# Patient Record
Sex: FEMALE | Race: WHITE | URBAN - METROPOLITAN AREA
[De-identification: names, ages, dates, MRNs, and addresses within clinical notes are randomized per-mention and may not be internally consistent; named-entity substitution may affect disease eponyms.]

---

## 2021-09-10 ENCOUNTER — OFFICE VISIT (OUTPATIENT)
Dept: FAMILY MEDICINE CLINIC | Facility: CLINIC | Age: 71
End: 2021-09-10
Payer: MEDICARE

## 2021-09-10 VITALS
BODY MASS INDEX: 23.82 KG/M2 | DIASTOLIC BLOOD PRESSURE: 84 MMHG | WEIGHT: 148.19 LBS | OXYGEN SATURATION: 98 % | SYSTOLIC BLOOD PRESSURE: 130 MMHG | RESPIRATION RATE: 16 BRPM | TEMPERATURE: 98 F | HEIGHT: 66 IN | HEART RATE: 105 BPM

## 2021-09-10 DIAGNOSIS — Z00.00 ENCOUNTER FOR ANNUAL HEALTH EXAMINATION: ICD-10-CM

## 2021-09-10 DIAGNOSIS — E78.49 OTHER HYPERLIPIDEMIA: ICD-10-CM

## 2021-09-10 DIAGNOSIS — E11.9 CONTROLLED TYPE 2 DIABETES MELLITUS WITHOUT COMPLICATION, WITHOUT LONG-TERM CURRENT USE OF INSULIN (HCC): ICD-10-CM

## 2021-09-10 DIAGNOSIS — Z00.00 ENCOUNTER FOR MEDICARE ANNUAL WELLNESS EXAM: Primary | ICD-10-CM

## 2021-09-10 DIAGNOSIS — I10 BENIGN ESSENTIAL HYPERTENSION: ICD-10-CM

## 2021-09-10 PROBLEM — N20.0 CALCULUS OF KIDNEY: Status: RESOLVED | Noted: 2018-07-05 | Resolved: 2021-09-10

## 2021-09-10 PROBLEM — E21.0 PRIMARY HYPERPARATHYROIDISM (HCC): Status: ACTIVE | Noted: 2017-12-13

## 2021-09-10 PROBLEM — N20.0 CALCULUS OF KIDNEY: Status: ACTIVE | Noted: 2018-07-05

## 2021-09-10 PROBLEM — E78.5 HYPERLIPIDEMIA: Status: ACTIVE | Noted: 2017-09-08

## 2021-09-10 PROCEDURE — G0439 PPPS, SUBSEQ VISIT: HCPCS | Performed by: FAMILY MEDICINE

## 2021-09-10 RX ORDER — METOPROLOL SUCCINATE 100 MG/1
100 TABLET, EXTENDED RELEASE ORAL DAILY
COMMUNITY
Start: 2021-07-14 | End: 2022-01-26

## 2021-09-10 RX ORDER — FENOFIBRATE 145 MG/1
145 TABLET, COATED ORAL DAILY
COMMUNITY
Start: 2021-07-14 | End: 2022-01-26

## 2021-09-10 RX ORDER — AMLODIPINE BESYLATE 5 MG/1
5 TABLET ORAL DAILY
COMMUNITY
Start: 2021-07-14 | End: 2022-01-26

## 2021-09-10 RX ORDER — METFORMIN HYDROCHLORIDE 500 MG/1
500 TABLET, EXTENDED RELEASE ORAL 2 TIMES DAILY
COMMUNITY
Start: 2021-07-14 | End: 2022-01-26

## 2021-09-10 RX ORDER — GLIPIZIDE 5 MG/1
5 TABLET, FILM COATED, EXTENDED RELEASE ORAL DAILY
COMMUNITY
Start: 2021-07-14 | End: 2022-01-26

## 2021-09-10 RX ORDER — EZETIMIBE 10 MG/1
10 TABLET ORAL DAILY
COMMUNITY
Start: 2021-07-14 | End: 2022-01-26

## 2021-09-10 RX ORDER — ENALAPRIL MALEATE 20 MG/1
20 TABLET ORAL 2 TIMES DAILY
COMMUNITY
Start: 2021-07-14 | End: 2022-01-26

## 2021-09-10 NOTE — PATIENT INSTRUCTIONS
Continue current medications  Labs reviewed from [facility]. Mammogram uptodate. Declined pneumonia vaccine. Blood pressure stable today. Return to clinic if any concern.      Advice low carb in diet, AVOID FOOD WITH HIGH GLYCEMIC INDEX, have smalle years    Covered every 2 years if patient is at high risk or previous colonoscopy was abnormal -    Colonoscopy Never done    Flexible Sigmoidoscopy   Covered every 4 years -    Fecal Occult Blood Test Covered annually -   Bone Density Screening    Bone de results found for: BUN    Drug Serum Conc Annually No results found for: DIGOXIN, DIG, VALP              Recommended Websites for Advanced Directives    [email or web address]  An information packet, including necessary

## 2021-09-10 NOTE — PROGRESS NOTES
HPI:   Tori Palmer is a 66year old female who presents for a Medicare Subsequent Annual Wellness visit (Pt already had Initial Annual Wellness). Patient presents to establish care, she is transferring care.   Has history of diabetes type 2, hyperten (Family Medicine)    Patient Active Problem List:     Benign essential hypertension     Controlled type 2 diabetes mellitus without complication, without long-term current use of insulin (HCC)     Disorder of bone and cartilage     Hyperlipidemia     Cale Bowser itching, no complaint of urinary incontinence   MUSCULOSKELETAL: denies back pain  NEURO: denies headaches  PSYCHE: denies depression or anxiety  HEMATOLOGIC: denies hx of anemia  ENDOCRINE: denies thyroid history  ALL/ASTHMA: denies hx of allergy or asthm Normal       Vaccination History     There is no immunization history on file for this patient. ASSESSMENT AND OTHER RELEVANT CHRONIC CONDITIONS:   Candy Carney is a 66year old female who presents for a Medicare Assessment.      PLAN SUMMARY:   Willian Conteh physician but may not be covered, or covered at this frequency, by your insurer. Please check with your insurance carrier before scheduling to verify coverage.    PREVENTATIVE SERVICES FREQUENCY &  COVERAGE DETAILS LAST COMPLETION DATE   Diabetes Screenin this time   Screening Mammogram    Mammogram     Recommend annually for all female patients aged 36 and older    One baseline mammogram covered for patients aged 32-38 -    Mammogram due on 08/02/2022    Immunizations    Influenza Covered once per flu seas

## 2022-01-26 ENCOUNTER — LAB ENCOUNTER (OUTPATIENT)
Dept: LAB | Age: 72
End: 2022-01-26
Attending: FAMILY MEDICINE
Payer: MEDICARE

## 2022-01-26 ENCOUNTER — OFFICE VISIT (OUTPATIENT)
Dept: FAMILY MEDICINE CLINIC | Facility: CLINIC | Age: 72
End: 2022-01-26
Payer: MEDICARE

## 2022-01-26 VITALS
DIASTOLIC BLOOD PRESSURE: 80 MMHG | TEMPERATURE: 99 F | WEIGHT: 149.19 LBS | BODY MASS INDEX: 23.98 KG/M2 | RESPIRATION RATE: 18 BRPM | HEART RATE: 97 BPM | SYSTOLIC BLOOD PRESSURE: 132 MMHG | OXYGEN SATURATION: 98 % | HEIGHT: 66 IN

## 2022-01-26 DIAGNOSIS — E11.9 CONTROLLED TYPE 2 DIABETES MELLITUS WITHOUT COMPLICATION, WITHOUT LONG-TERM CURRENT USE OF INSULIN (HCC): Primary | ICD-10-CM

## 2022-01-26 DIAGNOSIS — E78.49 OTHER HYPERLIPIDEMIA: ICD-10-CM

## 2022-01-26 DIAGNOSIS — Z12.11 COLON CANCER SCREENING: ICD-10-CM

## 2022-01-26 DIAGNOSIS — I10 BENIGN ESSENTIAL HYPERTENSION: ICD-10-CM

## 2022-01-26 DIAGNOSIS — E11.9 CONTROLLED TYPE 2 DIABETES MELLITUS WITHOUT COMPLICATION, WITHOUT LONG-TERM CURRENT USE OF INSULIN (HCC): ICD-10-CM

## 2022-01-26 LAB
ALBUMIN SERPL-MCNC: 3.8 G/DL (ref 3.4–5)
ALBUMIN/GLOB SERPL: 1.3 {RATIO} (ref 1–2)
ALP LIVER SERPL-CCNC: 95 U/L
ALT SERPL-CCNC: 32 U/L
ANION GAP SERPL CALC-SCNC: 7 MMOL/L (ref 0–18)
AST SERPL-CCNC: 18 U/L (ref 15–37)
BILIRUB SERPL-MCNC: 0.4 MG/DL (ref 0.1–2)
BUN BLD-MCNC: 23 MG/DL (ref 7–18)
CALCIUM BLD-MCNC: 10.1 MG/DL (ref 8.5–10.1)
CHLORIDE SERPL-SCNC: 107 MMOL/L (ref 98–112)
CO2 SERPL-SCNC: 28 MMOL/L (ref 21–32)
CREAT BLD-MCNC: 0.96 MG/DL
EST. AVERAGE GLUCOSE BLD GHB EST-MCNC: 126 MG/DL (ref 68–126)
FASTING STATUS PATIENT QL REPORTED: NO
GLOBULIN PLAS-MCNC: 3 G/DL (ref 2.8–4.4)
GLUCOSE BLD-MCNC: 179 MG/DL (ref 70–99)
HBA1C MFR BLD: 6 % (ref ?–5.7)
OSMOLALITY SERPL CALC.SUM OF ELEC: 302 MOSM/KG (ref 275–295)
POTASSIUM SERPL-SCNC: 4.2 MMOL/L (ref 3.5–5.1)
PROT SERPL-MCNC: 6.8 G/DL (ref 6.4–8.2)
SODIUM SERPL-SCNC: 142 MMOL/L (ref 136–145)

## 2022-01-26 PROCEDURE — 36415 COLL VENOUS BLD VENIPUNCTURE: CPT

## 2022-01-26 PROCEDURE — 80053 COMPREHEN METABOLIC PANEL: CPT

## 2022-01-26 PROCEDURE — 83036 HEMOGLOBIN GLYCOSYLATED A1C: CPT

## 2022-01-26 PROCEDURE — 99214 OFFICE O/P EST MOD 30 MIN: CPT | Performed by: FAMILY MEDICINE

## 2022-01-26 RX ORDER — METFORMIN HYDROCHLORIDE 500 MG/1
500 TABLET, EXTENDED RELEASE ORAL 2 TIMES DAILY
Qty: 90 TABLET | Refills: 1 | Status: SHIPPED | OUTPATIENT
Start: 2022-01-26

## 2022-01-26 RX ORDER — METOPROLOL SUCCINATE 100 MG/1
100 TABLET, EXTENDED RELEASE ORAL DAILY
Qty: 90 TABLET | Refills: 1 | Status: SHIPPED | OUTPATIENT
Start: 2022-01-26

## 2022-01-26 RX ORDER — PRAVASTATIN SODIUM 20 MG
1 TABLET ORAL DAILY
COMMUNITY
Start: 2021-11-18 | End: 2022-01-26

## 2022-01-26 RX ORDER — FENOFIBRATE 145 MG/1
145 TABLET, COATED ORAL DAILY
Qty: 90 TABLET | Refills: 1 | Status: SHIPPED | OUTPATIENT
Start: 2022-01-26

## 2022-01-26 RX ORDER — GLIPIZIDE 5 MG/1
5 TABLET, FILM COATED, EXTENDED RELEASE ORAL DAILY
Qty: 90 TABLET | Refills: 1 | Status: SHIPPED | OUTPATIENT
Start: 2022-01-26

## 2022-01-26 RX ORDER — ENALAPRIL MALEATE 20 MG/1
20 TABLET ORAL 2 TIMES DAILY
Qty: 90 TABLET | Refills: 1 | Status: SHIPPED | OUTPATIENT
Start: 2022-01-26

## 2022-01-26 RX ORDER — PRAVASTATIN SODIUM 20 MG
20 TABLET ORAL DAILY
Qty: 90 TABLET | Refills: 1 | Status: SHIPPED | OUTPATIENT
Start: 2022-01-26

## 2022-01-26 RX ORDER — EZETIMIBE 10 MG/1
10 TABLET ORAL DAILY
Qty: 90 TABLET | Refills: 1 | Status: SHIPPED | OUTPATIENT
Start: 2022-01-26

## 2022-01-26 RX ORDER — AMLODIPINE BESYLATE 5 MG/1
5 TABLET ORAL DAILY
Qty: 90 TABLET | Refills: 1 | Status: SHIPPED | OUTPATIENT
Start: 2022-01-26

## 2022-01-26 NOTE — PROGRESS NOTES
[address]  PROGRESS NOTE  Chief Complaint:   Patient presents with: Follow - Up      HPI:   This is a 66year old female with diabetes type 2, hypertension, hyperlipidemia presents for follow-up and medication checkup.     Has  been com mg total) by mouth daily. 90 tablet 1   • fenofibrate 145 MG Oral Tab Take 1 tablet (145 mg total) by mouth daily. 90 tablet 1   • glipiZIDE ER 5 MG Oral Tablet 24 Hr Take 1 tablet (5 mg total) by mouth daily.  90 tablet 1   • metFORMIN HCl  MG Oral T Exam:  GEN:  Patient is alert, awake and oriented, well developed, well nourished, no acute distress. EYES:  Sclera anicteric, conjunctiva normal, PERRLA, EOMI. NECK: Supple, no carotid bruit, no JVD, no thyromegaly.   LUNGS: Clear to auscultation bilter (145 mg total) by mouth daily.  -     glipiZIDE ER 5 MG Oral Tablet 24 Hr; Take 1 tablet (5 mg total) by mouth daily. -     metFORMIN HCl  MG Oral Tablet 24 Hr; Take 1 tablet (500 mg total) by mouth 2 (two) times daily.   -     metoprolol succinate 1 [facility])     Disorder of bone and cartilage     Hyperlipidemia     Primary hyperparathyroidism ([facility] [address]      Marina Lefort, MD    This note was created utilizing Dragon speech recognition software.  Please excuse any grammatical errors.  Call my office if you have

## 2022-01-26 NOTE — PATIENT INSTRUCTIONS
Continue current medications  Blood pressure stable today. Advice low salt diet and exercise. Monitor your blood pressure. Return to clinic if systolic blood pressure more than 709 or diastolic more than 211.   Advice low carb in diet, AVOID FOOD WITH HIG

## 2022-01-27 ENCOUNTER — TELEPHONE (OUTPATIENT)
Dept: FAMILY MEDICINE CLINIC | Facility: CLINIC | Age: 72
End: 2022-01-27

## 2022-01-27 NOTE — TELEPHONE ENCOUNTER
----- Message from Josseline James MD sent at 1/27/2022 10:03 AM CST -----  Please inform patient that her hemoglobin A1c is stable at 6. 0. Her glucose level is 179. Rest of CMP is okay. Continue with low-carb diet, exercise and current medications.

## 2022-01-31 ENCOUNTER — LAB ENCOUNTER (OUTPATIENT)
Dept: LAB | Facility: HOSPITAL | Age: 72
End: 2022-01-31
Attending: FAMILY MEDICINE
Payer: MEDICARE

## 2022-01-31 DIAGNOSIS — Z12.11 COLON CANCER SCREENING: ICD-10-CM

## 2022-02-01 ENCOUNTER — TELEPHONE (OUTPATIENT)
Dept: FAMILY MEDICINE CLINIC | Facility: CLINIC | Age: 72
End: 2022-02-01

## 2022-02-01 LAB — HEMOCCULT STL QL: NEGATIVE

## 2022-02-01 PROCEDURE — 82274 ASSAY TEST FOR BLOOD FECAL: CPT

## 2022-02-01 NOTE — TELEPHONE ENCOUNTER
----- Message from Manuel Patel MD sent at 2/1/2022 10:34 AM CST -----  Please inform patient that her fit test is negative.

## 2022-08-10 ENCOUNTER — TELEPHONE (OUTPATIENT)
Dept: FAMILY MEDICINE CLINIC | Facility: CLINIC | Age: 72
End: 2022-08-10

## 2022-08-10 ENCOUNTER — LABORATORY ENCOUNTER (OUTPATIENT)
Dept: LAB | Age: 72
End: 2022-08-10
Attending: FAMILY MEDICINE
Payer: MEDICARE

## 2022-08-10 ENCOUNTER — OFFICE VISIT (OUTPATIENT)
Dept: FAMILY MEDICINE CLINIC | Facility: CLINIC | Age: 72
End: 2022-08-10
Payer: MEDICARE

## 2022-08-10 VITALS
HEART RATE: 84 BPM | SYSTOLIC BLOOD PRESSURE: 122 MMHG | TEMPERATURE: 98 F | RESPIRATION RATE: 18 BRPM | OXYGEN SATURATION: 98 % | HEIGHT: 66 IN | BODY MASS INDEX: 21.73 KG/M2 | WEIGHT: 135.19 LBS | DIASTOLIC BLOOD PRESSURE: 78 MMHG

## 2022-08-10 DIAGNOSIS — Z12.31 BREAST CANCER SCREENING BY MAMMOGRAM: ICD-10-CM

## 2022-08-10 DIAGNOSIS — E78.49 OTHER HYPERLIPIDEMIA: ICD-10-CM

## 2022-08-10 DIAGNOSIS — E11.9 CONTROLLED TYPE 2 DIABETES MELLITUS WITHOUT COMPLICATION, WITHOUT LONG-TERM CURRENT USE OF INSULIN (HCC): ICD-10-CM

## 2022-08-10 DIAGNOSIS — E11.9 CONTROLLED TYPE 2 DIABETES MELLITUS WITHOUT COMPLICATION, WITHOUT LONG-TERM CURRENT USE OF INSULIN (HCC): Primary | ICD-10-CM

## 2022-08-10 DIAGNOSIS — I10 BENIGN ESSENTIAL HYPERTENSION: ICD-10-CM

## 2022-08-10 LAB
ALBUMIN SERPL-MCNC: 3.9 G/DL (ref 3.4–5)
ALBUMIN/GLOB SERPL: 1.1 {RATIO} (ref 1–2)
ALP LIVER SERPL-CCNC: 82 U/L
ALT SERPL-CCNC: 29 U/L
ANION GAP SERPL CALC-SCNC: 7 MMOL/L (ref 0–18)
AST SERPL-CCNC: 19 U/L (ref 15–37)
BILIRUB SERPL-MCNC: 0.4 MG/DL (ref 0.1–2)
BUN BLD-MCNC: 21 MG/DL (ref 7–18)
CALCIUM BLD-MCNC: 10.2 MG/DL (ref 8.5–10.1)
CHLORIDE SERPL-SCNC: 104 MMOL/L (ref 98–112)
CHOLEST SERPL-MCNC: 298 MG/DL (ref ?–200)
CO2 SERPL-SCNC: 25 MMOL/L (ref 21–32)
CREAT BLD-MCNC: 1 MG/DL
EST. AVERAGE GLUCOSE BLD GHB EST-MCNC: 120 MG/DL (ref 68–126)
FASTING PATIENT LIPID ANSWER: YES
FASTING STATUS PATIENT QL REPORTED: YES
GFR SERPLBLD BASED ON 1.73 SQ M-ARVRAT: 59 ML/MIN/1.73M2 (ref 60–?)
GLOBULIN PLAS-MCNC: 3.4 G/DL (ref 2.8–4.4)
GLUCOSE BLD-MCNC: 71 MG/DL (ref 70–99)
HBA1C MFR BLD: 5.8 % (ref ?–5.7)
HDLC SERPL-MCNC: 52 MG/DL (ref 40–59)
LDLC SERPL CALC-MCNC: 186 MG/DL (ref ?–100)
NONHDLC SERPL-MCNC: 246 MG/DL (ref ?–130)
OSMOLALITY SERPL CALC.SUM OF ELEC: 283 MOSM/KG (ref 275–295)
POTASSIUM SERPL-SCNC: 3.9 MMOL/L (ref 3.5–5.1)
PROT SERPL-MCNC: 7.3 G/DL (ref 6.4–8.2)
SODIUM SERPL-SCNC: 136 MMOL/L (ref 136–145)
TRIGL SERPL-MCNC: 309 MG/DL (ref 30–149)
VLDLC SERPL CALC-MCNC: 66 MG/DL (ref 0–30)

## 2022-08-10 PROCEDURE — 80053 COMPREHEN METABOLIC PANEL: CPT

## 2022-08-10 PROCEDURE — 99214 OFFICE O/P EST MOD 30 MIN: CPT | Performed by: FAMILY MEDICINE

## 2022-08-10 PROCEDURE — 80061 LIPID PANEL: CPT

## 2022-08-10 PROCEDURE — 36415 COLL VENOUS BLD VENIPUNCTURE: CPT

## 2022-08-10 PROCEDURE — 83036 HEMOGLOBIN GLYCOSYLATED A1C: CPT

## 2022-08-10 NOTE — TELEPHONE ENCOUNTER
Spoke to pt inquired about where medication was sent and where to call to get mammo scheduled. No further question.

## 2022-08-10 NOTE — PATIENT INSTRUCTIONS
Continue current medications  Blood pressure slightly elevated. Advice low salt diet and exercise. Monitor your blood pressure. Return to clinic if systolic blood pressure more than 140 or diastolic more than 988. Advice low carb in diet, AVOID FOOD WITH HIGH GLYCEMIC INDEX, have smaller portion meal, avoid bread, pasta and potatoes, no juice or soda, don't eat late at night, exercise,  and weight loss. Continue to monitor glucose level, call if glucose level less than 70 or more than 300. Advice low cholesterol diet and exercise. May use fish oil supplement. Recommend to get glucose monitor. Recommend to schedule eye exam as soon as possible. Have them fax result to us at [phone]. Schedule fasting labs. Recommend mammogram.  Recommend shingles vaccines.

## 2022-08-11 RX ORDER — GLIPIZIDE 2.5 MG/1
2.5 TABLET, EXTENDED RELEASE ORAL DAILY
Qty: 90 TABLET | Refills: 1 | Status: SHIPPED | OUTPATIENT
Start: 2022-08-11

## 2022-08-11 NOTE — TELEPHONE ENCOUNTER
Please inform patient that her total cholesterol remains elevated at 298, triglycerides are elevated at 309, LDL cholesterol is elevated at 186. Hemoglobin A1c has gone down to 5.8. Calcium is very slightly elevated at 10.2, recommend to cut back on any calcium supplement. Also recommend to continue with current cholesterol medication, low-cholesterol diet, exercise and fish oil supplement twice a day. Due to low hemoglobin A1c I recommend to cut down on glipizide to 2.5 mg daily. Continue to monitor for low glucose level. Call back his glucose level is less than 70. I have pended medication, okay to send after discussing results with patient.

## 2022-10-11 DIAGNOSIS — E11.9 CONTROLLED TYPE 2 DIABETES MELLITUS WITHOUT COMPLICATION, WITHOUT LONG-TERM CURRENT USE OF INSULIN (HCC): Primary | ICD-10-CM

## 2022-10-11 RX ORDER — METFORMIN HYDROCHLORIDE 500 MG/1
500 TABLET, EXTENDED RELEASE ORAL 2 TIMES DAILY
Qty: 90 TABLET | Refills: 1 | Status: SHIPPED | OUTPATIENT
Start: 2022-10-11

## 2022-10-11 NOTE — TELEPHONE ENCOUNTER
Last refill - 1/26/22 #90 w/1refill    Future appt: Your appointments     Date & Time Appointment Department [facility])    Feb 01, 2023  8:00 AM CST Medicare Annual Well Visit with Shea Talbert MD 25 [facility] ([facility])            [facility]  [facility] [address] [id number]  [phone]          Last Appointment with provider: 8/10/2022 - dm  Last appointment at [facility]:  8/10/2022  Cholesterol, Total (mg/dL)   Date Value   08/10/2022 298 (H)     HDL Cholesterol (mg/dL)   Date Value   08/10/2022 52     LDL Cholesterol (mg/dL)   Date Value   08/10/2022 186 (H)     Triglycerides (mg/dL)   Date Value   08/10/2022 309 (H)     Lab Results   Component Value Date     08/10/2022    A1C 5.8 (H) 08/10/2022     No results found for: T4F, TSH, TSHT4    No follow-ups on file.

## 2022-11-29 ENCOUNTER — TELEPHONE (OUTPATIENT)
Dept: FAMILY MEDICINE CLINIC | Facility: CLINIC | Age: 72
End: 2022-11-29

## 2022-11-29 NOTE — TELEPHONE ENCOUNTER
Spoke with pt and informed her of Dr. Omar Councilman recommendations. Pt verbalizes agreement and will notify us when she completes booster vaccination.

## 2022-11-29 NOTE — TELEPHONE ENCOUNTER
Pt states that she got her covid shot but wants to know if she should get the booster due to her age. Would like a call back.

## 2022-12-21 ENCOUNTER — TELEPHONE (OUTPATIENT)
Dept: FAMILY MEDICINE CLINIC | Facility: CLINIC | Age: 72
End: 2022-12-21

## 2022-12-21 DIAGNOSIS — E11.9 CONTROLLED TYPE 2 DIABETES MELLITUS WITHOUT COMPLICATION, WITHOUT LONG-TERM CURRENT USE OF INSULIN (HCC): ICD-10-CM

## 2022-12-21 RX ORDER — METFORMIN HYDROCHLORIDE 500 MG/1
500 TABLET, EXTENDED RELEASE ORAL 2 TIMES DAILY
Qty: 90 TABLET | Refills: 0 | Status: SHIPPED | OUTPATIENT
Start: 2022-12-21

## 2022-12-21 RX ORDER — PRAVASTATIN SODIUM 20 MG
20 TABLET ORAL DAILY
Qty: 90 TABLET | Refills: 0 | Status: SHIPPED | OUTPATIENT
Start: 2022-12-21

## 2022-12-21 RX ORDER — ENALAPRIL MALEATE 20 MG/1
20 TABLET ORAL 2 TIMES DAILY
Qty: 90 TABLET | Refills: 1 | Status: SHIPPED | OUTPATIENT
Start: 2022-12-21

## 2022-12-21 RX ORDER — AMLODIPINE BESYLATE 5 MG/1
5 TABLET ORAL DAILY
Qty: 90 TABLET | Refills: 0 | Status: SHIPPED | OUTPATIENT
Start: 2022-12-21

## 2022-12-21 RX ORDER — EZETIMIBE 10 MG/1
10 TABLET ORAL DAILY
Qty: 90 TABLET | Refills: 0 | Status: SHIPPED | OUTPATIENT
Start: 2022-12-21

## 2022-12-21 RX ORDER — GLIPIZIDE 2.5 MG/1
2.5 TABLET, EXTENDED RELEASE ORAL DAILY
Qty: 90 TABLET | Refills: 0 | Status: SHIPPED | OUTPATIENT
Start: 2022-12-21

## 2022-12-21 NOTE — TELEPHONE ENCOUNTER
Needs all rxs filled before Feb appt.  Wants 1 month sent to [facility] on all 7 of them

## 2022-12-21 NOTE — TELEPHONE ENCOUNTER
Last appt 8/10/22 advised Return in about 6 months (around 2/10/2023) for physical.  Pt reported that she had a supply of many of these medications from previous PCP that she used those first.       Future appt: Your appointments     Date & Time Appointment Department [facility])    Feb 01, 2023  8:00 AM CST Medicare Annual Well Visit with Gris Ibanez MD 25 [facility] ([facility])            25 [facility] [place]  [place] [id number]  [phone]        Last Appointment with provider:   8/10/2022  Last appointment at [facility]:  8/10/2022  Cholesterol, Total (mg/dL)   Date Value   08/10/2022 298 (H)     HDL Cholesterol (mg/dL)   Date Value   08/10/2022 52     LDL Cholesterol (mg/dL)   Date Value   08/10/2022 186 (H)     Triglycerides (mg/dL)   Date Value   08/10/2022 309 (H)     Lab Results   Component Value Date     08/10/2022    A1C 5.8 (H) 08/10/2022     No results found for: T4F, TSH, TSHT4    No follow-ups on file.

## 2022-12-30 DIAGNOSIS — E11.9 CONTROLLED TYPE 2 DIABETES MELLITUS WITHOUT COMPLICATION, WITHOUT LONG-TERM CURRENT USE OF INSULIN (HCC): ICD-10-CM

## 2022-12-30 DIAGNOSIS — I10 BENIGN ESSENTIAL HYPERTENSION: ICD-10-CM

## 2022-12-30 DIAGNOSIS — E78.49 OTHER HYPERLIPIDEMIA: Primary | ICD-10-CM

## 2022-12-30 RX ORDER — METOPROLOL SUCCINATE 100 MG/1
100 TABLET, EXTENDED RELEASE ORAL DAILY
Qty: 90 TABLET | Refills: 1 | Status: SHIPPED | OUTPATIENT
Start: 2022-12-30

## 2022-12-30 RX ORDER — FENOFIBRATE 145 MG/1
145 TABLET, COATED ORAL DAILY
Qty: 90 TABLET | Refills: 1 | Status: SHIPPED | OUTPATIENT
Start: 2022-12-30

## 2022-12-30 NOTE — TELEPHONE ENCOUNTER
Future Appointments   Date Time Provider [facility]   2/1/2023  8:00 AM Gris Ibanez MD [facility] [place]     Amlodipine, Ezetimibe, Glipizide, Metformin, Provastatin, Enalapril, Fenofibrate, Metoprolol to [facility] X 2 months. Is completely out of a couple. Needs refills today. Will go  this afternoon. Call patient with only questions or problems.

## 2022-12-30 NOTE — TELEPHONE ENCOUNTER
Future appt: Your appointments     Date & Time Appointment Department [facility])    Feb 01, 2023  8:00 AM [facility] Medicare Annual Well Visit with Beckie Khalil MD 25 [facility] ([facility])            25 [facility] [place]  [address] [address] [phone]  [phone]        Last Appointment with provider:   8/10/2022(DM)-f/u 6 mo(2/10/23)  Last appointment at [facility]:  8/10/2022    fenofibrate 145 MG Oral Tab    90tab  1refill        Filled:1/26/22 Summary: Take 1 tablet (145 mg total) by mouth daily        metoprolol succinate 100 MG Oral Tablet 24 Hr    90tab  1refill      Filled:1/26/22 Summary: Take 1 tablet (100 mg total) by mouth daily          Cholesterol, Total (mg/dL)   Date Value   08/10/2022 298 (H)     HDL Cholesterol (mg/dL)   Date Value   08/10/2022 52     LDL Cholesterol (mg/dL)   Date Value   08/10/2022 186 (H)     Triglycerides (mg/dL)   Date Value   08/10/2022 309 (H)     Lab Results   Component Value Date     08/10/2022    A1C 5.8 (H) 08/10/2022     No results found for: T4F, TSH, TSHT4    No follow-ups on file.

## 2023-01-03 ENCOUNTER — TELEPHONE (OUTPATIENT)
Dept: FAMILY MEDICINE CLINIC | Facility: CLINIC | Age: 73
End: 2023-01-03

## 2023-01-03 NOTE — TELEPHONE ENCOUNTER
Patient states she did not receive her medications  Sent in on 12/21. Phoned [facility]. States patient just called them  And she did receive the medications that were  Sent. Phoned patient---  She has all of her refills.   Keshav Delacruz [facility], 01/03/23, 12:48 PM

## 2023-01-03 NOTE — TELEPHONE ENCOUNTER
Needs refills on metformin and previstatin to [facility] in [place]. Patient has been out for a week. Wrong rxs were refilled.  Needs enough until Feb . Wants to know why she was refilled the wrong ones

## 2023-02-01 ENCOUNTER — LAB ENCOUNTER (OUTPATIENT)
Dept: LAB | Age: 73
End: 2023-02-01
Attending: FAMILY MEDICINE
Payer: MEDICARE

## 2023-02-01 ENCOUNTER — OFFICE VISIT (OUTPATIENT)
Dept: FAMILY MEDICINE CLINIC | Facility: CLINIC | Age: 73
End: 2023-02-01
Payer: MEDICARE

## 2023-02-01 VITALS
RESPIRATION RATE: 16 BRPM | BODY MASS INDEX: 22.31 KG/M2 | SYSTOLIC BLOOD PRESSURE: 138 MMHG | OXYGEN SATURATION: 98 % | WEIGHT: 138.81 LBS | HEART RATE: 76 BPM | TEMPERATURE: 98 F | DIASTOLIC BLOOD PRESSURE: 84 MMHG | HEIGHT: 66 IN

## 2023-02-01 DIAGNOSIS — E78.2 MIXED HYPERLIPIDEMIA: ICD-10-CM

## 2023-02-01 DIAGNOSIS — Z13.6 SCREENING FOR CARDIOVASCULAR CONDITION: ICD-10-CM

## 2023-02-01 DIAGNOSIS — E11.9 CONTROLLED TYPE 2 DIABETES MELLITUS WITHOUT COMPLICATION, WITHOUT LONG-TERM CURRENT USE OF INSULIN (HCC): ICD-10-CM

## 2023-02-01 DIAGNOSIS — Z13.1 SCREENING FOR DIABETES MELLITUS (DM): ICD-10-CM

## 2023-02-01 DIAGNOSIS — I10 BENIGN ESSENTIAL HYPERTENSION: ICD-10-CM

## 2023-02-01 DIAGNOSIS — Z00.00 ENCOUNTER FOR ANNUAL HEALTH EXAMINATION: ICD-10-CM

## 2023-02-01 DIAGNOSIS — Z00.00 ENCOUNTER FOR MEDICARE ANNUAL WELLNESS EXAM: Primary | ICD-10-CM

## 2023-02-01 PROBLEM — E21.0 PRIMARY HYPERPARATHYROIDISM (HCC): Status: RESOLVED | Noted: 2017-12-13 | Resolved: 2023-02-01

## 2023-02-01 LAB
ALBUMIN SERPL-MCNC: 4.1 G/DL (ref 3.4–5)
ALBUMIN/GLOB SERPL: 1.2 {RATIO} (ref 1–2)
ALP LIVER SERPL-CCNC: 63 U/L
ALT SERPL-CCNC: 30 U/L
ANION GAP SERPL CALC-SCNC: 7 MMOL/L (ref 0–18)
AST SERPL-CCNC: 20 U/L (ref 15–37)
BASOPHILS # BLD AUTO: 0.06 X10(3) UL (ref 0–0.2)
BASOPHILS NFR BLD AUTO: 0.6 %
BILIRUB SERPL-MCNC: 0.5 MG/DL (ref 0.1–2)
BUN BLD-MCNC: 23 MG/DL (ref 7–18)
CALCIUM BLD-MCNC: 10.6 MG/DL (ref 8.5–10.1)
CHLORIDE SERPL-SCNC: 105 MMOL/L (ref 98–112)
CHOLEST SERPL-MCNC: 215 MG/DL (ref ?–200)
CO2 SERPL-SCNC: 25 MMOL/L (ref 21–32)
CREAT BLD-MCNC: 1.29 MG/DL
CREAT UR-SCNC: 67.3 MG/DL
EOSINOPHIL # BLD AUTO: 0.21 X10(3) UL (ref 0–0.7)
EOSINOPHIL NFR BLD AUTO: 2.2 %
ERYTHROCYTE [DISTWIDTH] IN BLOOD BY AUTOMATED COUNT: 14.1 %
FASTING PATIENT LIPID ANSWER: YES
FASTING STATUS PATIENT QL REPORTED: YES
GFR SERPLBLD BASED ON 1.73 SQ M-ARVRAT: 44 ML/MIN/1.73M2 (ref 60–?)
GLOBULIN PLAS-MCNC: 3.5 G/DL (ref 2.8–4.4)
GLUCOSE BLD-MCNC: 119 MG/DL (ref 70–99)
HCT VFR BLD AUTO: 47.7 %
HDLC SERPL-MCNC: 58 MG/DL (ref 40–59)
HGB BLD-MCNC: 15.4 G/DL
IMM GRANULOCYTES # BLD AUTO: 0.04 X10(3) UL (ref 0–1)
IMM GRANULOCYTES NFR BLD: 0.4 %
LDLC SERPL CALC-MCNC: 129 MG/DL (ref ?–100)
LYMPHOCYTES # BLD AUTO: 2.33 X10(3) UL (ref 1–4)
LYMPHOCYTES NFR BLD AUTO: 24.8 %
MCH RBC QN AUTO: 27.8 PG (ref 26–34)
MCHC RBC AUTO-ENTMCNC: 32.3 G/DL (ref 31–37)
MCV RBC AUTO: 86.3 FL
MICROALBUMIN UR-MCNC: 3.53 MG/DL
MICROALBUMIN/CREAT 24H UR-RTO: 52.5 UG/MG (ref ?–30)
MONOCYTES # BLD AUTO: 0.89 X10(3) UL (ref 0.1–1)
MONOCYTES NFR BLD AUTO: 9.5 %
NEUTROPHILS # BLD AUTO: 5.85 X10 (3) UL (ref 1.5–7.7)
NEUTROPHILS # BLD AUTO: 5.85 X10(3) UL (ref 1.5–7.7)
NEUTROPHILS NFR BLD AUTO: 62.5 %
NONHDLC SERPL-MCNC: 157 MG/DL (ref ?–130)
OSMOLALITY SERPL CALC.SUM OF ELEC: 289 MOSM/KG (ref 275–295)
PLATELET # BLD AUTO: 435 10(3)UL (ref 150–450)
POTASSIUM SERPL-SCNC: 4 MMOL/L (ref 3.5–5.1)
PROT SERPL-MCNC: 7.6 G/DL (ref 6.4–8.2)
RBC # BLD AUTO: 5.53 X10(6)UL
SODIUM SERPL-SCNC: 137 MMOL/L (ref 136–145)
TRIGL SERPL-MCNC: 160 MG/DL (ref 30–149)
TSI SER-ACNC: 1.24 MIU/ML (ref 0.36–3.74)
VLDLC SERPL CALC-MCNC: 29 MG/DL (ref 0–30)
WBC # BLD AUTO: 9.4 X10(3) UL (ref 4–11)

## 2023-02-01 PROCEDURE — 80053 COMPREHEN METABOLIC PANEL: CPT

## 2023-02-01 PROCEDURE — 82043 UR ALBUMIN QUANTITATIVE: CPT

## 2023-02-01 PROCEDURE — G0439 PPPS, SUBSEQ VISIT: HCPCS | Performed by: FAMILY MEDICINE

## 2023-02-01 PROCEDURE — 85025 COMPLETE CBC W/AUTO DIFF WBC: CPT

## 2023-02-01 PROCEDURE — 80061 LIPID PANEL: CPT

## 2023-02-01 PROCEDURE — 82570 ASSAY OF URINE CREATININE: CPT

## 2023-02-01 PROCEDURE — 83036 HEMOGLOBIN GLYCOSYLATED A1C: CPT

## 2023-02-01 PROCEDURE — 84443 ASSAY THYROID STIM HORMONE: CPT

## 2023-02-01 PROCEDURE — 36415 COLL VENOUS BLD VENIPUNCTURE: CPT

## 2023-02-02 ENCOUNTER — TELEPHONE (OUTPATIENT)
Dept: FAMILY MEDICINE CLINIC | Facility: CLINIC | Age: 73
End: 2023-02-02

## 2023-02-02 DIAGNOSIS — N18.31 STAGE 3A CHRONIC KIDNEY DISEASE (HCC): Primary | ICD-10-CM

## 2023-02-02 DIAGNOSIS — Z86.39 HISTORY OF HYPERPARATHYROIDISM: ICD-10-CM

## 2023-02-02 LAB
EST. AVERAGE GLUCOSE BLD GHB EST-MCNC: 137 MG/DL (ref 68–126)
HBA1C MFR BLD: 6.4 % (ref ?–5.7)

## 2023-02-02 NOTE — TELEPHONE ENCOUNTER
Please inform patient that her hemoglobin A1c is in prediabetes range at 6.4, it has gone up from last time. Her kidney function has declined further, GFR is low at 44, BUN and creatinine is elevated. Calcium level also has gone up to 10.6. Her total cholesterol, triglycerides and LDL cholesterol is improving, still remains elevated at 215, 160 and 129. Rest of labs are okay. Continue with current medication. Continue with a low-carb diet, low-cholesterol diet, exercise, plenty of fluid. Avoid any long-term use of Aleve or ibuprofen. Also recommend to schedule appointment with nephrologist for evaluation of declining kidney function. I have placed referral in chart, please provide patient with information.

## 2023-02-02 NOTE — TELEPHONE ENCOUNTER
Spoke to pt verbalized understanding of lab results and recommendations. Pt is reluctant to make any changes.      Pt stated that she wants to have a local nephrologist, \"I will not go to [place]\"     Please advise

## 2023-02-02 NOTE — TELEPHONE ENCOUNTER
It is local nephrologist. She has office in [place].  Please see referral.     Dr Shira Jones MD  [facility]  [address] [place] [address]  Phone: [phone] Fax: [phone]

## 2023-02-03 ENCOUNTER — TELEPHONE (OUTPATIENT)
Dept: FAMILY MEDICINE CLINIC | Facility: CLINIC | Age: 73
End: 2023-02-03

## 2023-02-03 NOTE — TELEPHONE ENCOUNTER
Spoke to pt wanted to make sure that all 8 of her scripts were sent to [facility]. Informed pt all were sent at the end of December 2022.      No further questions

## 2023-02-06 ENCOUNTER — TELEPHONE (OUTPATIENT)
Dept: FAMILY MEDICINE CLINIC | Facility: CLINIC | Age: 73
End: 2023-02-06

## 2023-02-06 DIAGNOSIS — E11.9 CONTROLLED TYPE 2 DIABETES MELLITUS WITHOUT COMPLICATION, WITHOUT LONG-TERM CURRENT USE OF INSULIN (HCC): ICD-10-CM

## 2023-02-06 RX ORDER — METFORMIN HYDROCHLORIDE 500 MG/1
500 TABLET, EXTENDED RELEASE ORAL 2 TIMES DAILY
Qty: 90 TABLET | Refills: 0 | Status: SHIPPED | OUTPATIENT
Start: 2023-02-06

## 2023-02-06 NOTE — TELEPHONE ENCOUNTER
Spoke to pharmacy confirmed they have all the scripts, 3 were sent for #90 with 1 refill, 5 others for #90 and metformin was sent for #90- 45 day supply- pt needs another 45 day supply to get all meds in time line. Last appt 2/1/23 advised Return in about 6 months (around 8/1/2023) for follow up.       Future Appointments   Date Time Provider [facility]   8/3/2023  8:00 AM Florencio Sylvester MD [facility]

## 2023-02-06 NOTE — TELEPHONE ENCOUNTER
Patient unable to refill meds given last week, patient states [facility] doesn't have medications.     Call back [phone]

## 2023-02-08 ENCOUNTER — TELEPHONE (OUTPATIENT)
Dept: FAMILY MEDICINE CLINIC | Facility: CLINIC | Age: 73
End: 2023-02-08

## 2023-02-08 DIAGNOSIS — Z12.11 ENCOUNTER FOR SCREENING COLONOSCOPY: Primary | ICD-10-CM

## 2023-02-27 ENCOUNTER — TELEPHONE (OUTPATIENT)
Dept: FAMILY MEDICINE CLINIC | Facility: CLINIC | Age: 73
End: 2023-02-27

## 2023-02-27 NOTE — TELEPHONE ENCOUNTER
PUJAI  Pt calling upset and doesn't know why she was referred to kidney doctor. Went through result note from 2/2/23 explaining about declining kidney function, I even went through all results recommendations. As pt doesn't believe any one call her to explain why she needed to see the kidney doctor or with results. Tried explaining that [facility] did call, but she doesn't seem to remember. Pt seem to calm down after explaining results and recommendations to her, had to re-explain 2-3 times. Pt states she has appt with Dr Christopher Carrion 3/28. Pt stated address and appt dates severe times to me so she wouldn't forget.

## 2023-02-28 ENCOUNTER — TELEPHONE (OUTPATIENT)
Dept: FAMILY MEDICINE CLINIC | Facility: CLINIC | Age: 73
End: 2023-02-28

## 2023-02-28 NOTE — TELEPHONE ENCOUNTER
Pt called wanting to know why she was going to see nephrologist, informed per last phone call with her on 2/2/23 we discussed the lab results and pt was advised to go to nephrologist.     Pt verbalized understanding, pt has an appt already set up. No further question.

## 2023-03-02 ENCOUNTER — TELEPHONE (OUTPATIENT)
Dept: FAMILY MEDICINE CLINIC | Facility: CLINIC | Age: 73
End: 2023-03-02

## 2023-03-02 NOTE — TELEPHONE ENCOUNTER
Spoke to pt informed of information.     Pt will call pharmacy to get script filled, she does not have them    No further questions

## 2023-03-02 NOTE — TELEPHONE ENCOUNTER
Spoke to pharmacy stated that the patient was in and they showed her that she picked up the metformin on 2/7/23 #90 which is a 45 day supply. Pt was advised to check at home for the medication. Pt does have an older script that they can fill if pt does not have the medication.

## 2023-03-02 NOTE — TELEPHONE ENCOUNTER
Spoke to pt stated that her script for metformin was not given in full at last fill and now stating that the pharmacy said there is not fill available.    Informed pt would call pharmacy

## 2023-03-09 ENCOUNTER — TELEPHONE (OUTPATIENT)
Dept: FAMILY MEDICINE CLINIC | Facility: CLINIC | Age: 73
End: 2023-03-09

## 2023-03-09 DIAGNOSIS — E11.9 CONTROLLED TYPE 2 DIABETES MELLITUS WITHOUT COMPLICATION, WITHOUT LONG-TERM CURRENT USE OF INSULIN (HCC): ICD-10-CM

## 2023-03-09 NOTE — TELEPHONE ENCOUNTER
Last appt 2/1/23 advised Return in about 6 months (around 8/1/2023) for follow up.       Future Appointments   Date Time Provider [facility]   8/3/2023  8:00 AM Dominique Gil MD [facility] [place]

## 2023-03-10 RX ORDER — METFORMIN HYDROCHLORIDE 500 MG/1
500 TABLET, EXTENDED RELEASE ORAL 2 TIMES DAILY
Qty: 180 TABLET | Refills: 1 | Status: SHIPPED | OUTPATIENT
Start: 2023-03-10

## 2023-03-17 ENCOUNTER — TELEPHONE (OUTPATIENT)
Dept: FAMILY MEDICINE CLINIC | Facility: CLINIC | Age: 73
End: 2023-03-17

## 2023-03-17 NOTE — TELEPHONE ENCOUNTER
Spoke to pt wanting information again regarding nephrologist.   Reviewed all the information and pt wrote down information and had no further questions

## 2023-03-19 RX ORDER — PRAVASTATIN SODIUM 20 MG
20 TABLET ORAL DAILY
Qty: 90 TABLET | Refills: 1 | Status: SHIPPED | OUTPATIENT
Start: 2023-03-19

## 2023-03-19 RX ORDER — AMLODIPINE BESYLATE 5 MG/1
5 TABLET ORAL DAILY
Qty: 90 TABLET | Refills: 1 | Status: SHIPPED | OUTPATIENT
Start: 2023-03-19

## 2023-03-19 RX ORDER — EZETIMIBE 10 MG/1
10 TABLET ORAL DAILY
Qty: 90 TABLET | Refills: 1 | Status: SHIPPED | OUTPATIENT
Start: 2023-03-19

## 2023-03-19 RX ORDER — GLIPIZIDE 2.5 MG/1
2.5 TABLET, EXTENDED RELEASE ORAL DAILY
Qty: 90 TABLET | Refills: 1 | Status: SHIPPED | OUTPATIENT
Start: 2023-03-19

## 2023-03-22 ENCOUNTER — TELEPHONE (OUTPATIENT)
Dept: FAMILY MEDICINE CLINIC | Facility: CLINIC | Age: 73
End: 2023-03-22

## 2023-03-22 NOTE — TELEPHONE ENCOUNTER
Questioning kidney referral   Not sure why she is going to see kidney Dr  Would like a nurse to call her back  p [phone]

## 2023-03-22 NOTE — TELEPHONE ENCOUNTER
Spoke to pt discussed again the referral to nephrologist. Pt stated that \"I am confused about why I need to go to a nephrologist, I have no issue with going to the bathroom or any symptoms\"   \"this doesn't make any sense to me\" \"nobody has explained this to me what is wrong\" informed pt that we have discussed her labs and we are sending her to a specialist.   Pt repeated over and over the same concerns that was discussed over several phone conversations. Pt asked for a 2 paragraph paper with what is wrong so she can review. Advised pt that Dr. Angela Rodas is out of office till Friday and he would be the one to discuss with. Pt advised to have appt to see  to discuss her concerns.    Appt made    Future Appointments   Date Time Provider [facility] Barker   3/24/2023 10:00 AM Dominique Gil MD [facility] [place]   8/3/2023  8:00 AM Lou Frazier MD [facility] [place] [facility]

## 2023-03-23 ENCOUNTER — TELEPHONE (OUTPATIENT)
Dept: FAMILY MEDICINE CLINIC | Facility: CLINIC | Age: 73
End: 2023-03-23

## 2023-03-23 NOTE — TELEPHONE ENCOUNTER
Recommend to call emergency contact daughter or friend and inform that patient seems like she recently has been sounding very confused on phone. Are they able to come to office visit tomorrow or make sure that she is doing well and is at her normal baseline.

## 2023-03-23 NOTE — TELEPHONE ENCOUNTER
Spoke to Abby Richter, informed about what has transpired with pt. Abby Richter thanked us for the call and noted that the behavior has been noted by him as well. Stated that he is in the process of looking for a facility for pt. Noted that things seem to be progressing faster for pt. Abby Richter would like an update on pt following appt.

## 2023-03-23 NOTE — TELEPHONE ENCOUNTER
Spoke to pt she was a little frantic because she has 2 addresses for an appt and \"someone called from this [id number] number and I need to know when my surgery is\"  Informed pt that we are not aware of any surgery, pt stated it was for a lump on her neck. PT then went back to the addresses. Stopped pt and asked if I could tell her what we have. Pt was ok with that. Informed pt that the [id number] number is our office and that pt has an appt tomorrow morning with Dr. Latasha Gilbert, pt stated \"no one told me that\" I gently reminded pt that I spoke to her yesterday and set up the appt for her. The date and time of appt needed to be repeated several times, pt stated that she was very tired and not thinking straight. Pt wanted to discuss other appt times, I advised pt that we should just cover tomorrows appt and go from there. Pt was ok with that, and proceeded to repeat the appt date, time, day of week,etc over and over.

## 2023-03-23 NOTE — TELEPHONE ENCOUNTER
Spoke to pt daughter, Flor Like (consent on file) informed about conversations with pt. Daughter lives in [place], son lives in [place], per Flor Wren pt friend, Ayah Room (on consent form) is her CPA and also has full POA- medical and financial over pt. Daughter was very appreciative for the call, stated that since the pandemic started pt has isolated herself and her memory is gone. Pt will not listen to family, feels that they are out to get her. Pt doesn't trust anyone and conversations tend to be very repetitive. Family stated this is her new normal and they as well are concerned. Flor Like stated that she does not believe that [facility] is aware of the present condition of pt. Informed Flor Like that she does have an appt tomorrow.

## 2023-03-23 NOTE — TELEPHONE ENCOUNTER
Pt is asking for information about her upcoming surgery on 3/28- states she has an appt with dr Michael Osullivan for a pre-op on 3/24- appt listed is for nephrology referral - is asking to speak to nurse

## 2023-03-24 ENCOUNTER — OFFICE VISIT (OUTPATIENT)
Dept: FAMILY MEDICINE CLINIC | Facility: CLINIC | Age: 73
End: 2023-03-24
Payer: MEDICARE

## 2023-03-24 ENCOUNTER — LAB ENCOUNTER (OUTPATIENT)
Dept: LAB | Age: 73
End: 2023-03-24
Attending: FAMILY MEDICINE
Payer: MEDICARE

## 2023-03-24 VITALS
WEIGHT: 134.38 LBS | OXYGEN SATURATION: 98 % | RESPIRATION RATE: 17 BRPM | DIASTOLIC BLOOD PRESSURE: 78 MMHG | BODY MASS INDEX: 21.6 KG/M2 | HEIGHT: 66 IN | TEMPERATURE: 98 F | HEART RATE: 109 BPM | SYSTOLIC BLOOD PRESSURE: 138 MMHG

## 2023-03-24 DIAGNOSIS — N18.31 STAGE 3A CHRONIC KIDNEY DISEASE (HCC): Primary | ICD-10-CM

## 2023-03-24 DIAGNOSIS — R00.0 TACHYCARDIA: ICD-10-CM

## 2023-03-24 DIAGNOSIS — R41.89 COGNITIVE DECLINE: ICD-10-CM

## 2023-03-24 DIAGNOSIS — N18.31 STAGE 3A CHRONIC KIDNEY DISEASE (HCC): ICD-10-CM

## 2023-03-24 LAB
ALBUMIN SERPL-MCNC: 4.2 G/DL (ref 3.4–5)
ALBUMIN/GLOB SERPL: 1.2 {RATIO} (ref 1–2)
ALP LIVER SERPL-CCNC: 87 U/L
ALT SERPL-CCNC: 32 U/L
ANION GAP SERPL CALC-SCNC: 8 MMOL/L (ref 0–18)
AST SERPL-CCNC: 17 U/L (ref 15–37)
BASOPHILS # BLD AUTO: 0.04 X10(3) UL (ref 0–0.2)
BASOPHILS NFR BLD AUTO: 0.3 %
BILIRUB SERPL-MCNC: 0.5 MG/DL (ref 0.1–2)
BUN BLD-MCNC: 16 MG/DL (ref 7–18)
CALCIUM BLD-MCNC: 10.4 MG/DL (ref 8.5–10.1)
CHLORIDE SERPL-SCNC: 107 MMOL/L (ref 98–112)
CO2 SERPL-SCNC: 25 MMOL/L (ref 21–32)
CREAT BLD-MCNC: 1 MG/DL
EOSINOPHIL # BLD AUTO: 0.17 X10(3) UL (ref 0–0.7)
EOSINOPHIL NFR BLD AUTO: 1.4 %
ERYTHROCYTE [DISTWIDTH] IN BLOOD BY AUTOMATED COUNT: 15.6 %
FASTING STATUS PATIENT QL REPORTED: NO
GFR SERPLBLD BASED ON 1.73 SQ M-ARVRAT: 59 ML/MIN/1.73M2 (ref 60–?)
GLOBULIN PLAS-MCNC: 3.6 G/DL (ref 2.8–4.4)
GLUCOSE BLD-MCNC: 137 MG/DL (ref 70–99)
HCT VFR BLD AUTO: 50 %
HGB BLD-MCNC: 16 G/DL
IMM GRANULOCYTES # BLD AUTO: 0.04 X10(3) UL (ref 0–1)
IMM GRANULOCYTES NFR BLD: 0.3 %
LYMPHOCYTES # BLD AUTO: 2.21 X10(3) UL (ref 1–4)
LYMPHOCYTES NFR BLD AUTO: 18.7 %
MAGNESIUM SERPL-MCNC: 1.8 MG/DL (ref 1.6–2.6)
MCH RBC QN AUTO: 27.9 PG (ref 26–34)
MCHC RBC AUTO-ENTMCNC: 32 G/DL (ref 31–37)
MCV RBC AUTO: 87.1 FL
MONOCYTES # BLD AUTO: 1 X10(3) UL (ref 0.1–1)
MONOCYTES NFR BLD AUTO: 8.5 %
NEUTROPHILS # BLD AUTO: 8.33 X10 (3) UL (ref 1.5–7.7)
NEUTROPHILS # BLD AUTO: 8.33 X10(3) UL (ref 1.5–7.7)
NEUTROPHILS NFR BLD AUTO: 70.8 %
OSMOLALITY SERPL CALC.SUM OF ELEC: 293 MOSM/KG (ref 275–295)
PLATELET # BLD AUTO: 461 10(3)UL (ref 150–450)
POTASSIUM SERPL-SCNC: 3.8 MMOL/L (ref 3.5–5.1)
PROT SERPL-MCNC: 7.8 G/DL (ref 6.4–8.2)
RBC # BLD AUTO: 5.74 X10(6)UL
SODIUM SERPL-SCNC: 140 MMOL/L (ref 136–145)
VIT B12 SERPL-MCNC: 524 PG/ML (ref 193–986)
WBC # BLD AUTO: 11.8 X10(3) UL (ref 4–11)

## 2023-03-24 PROCEDURE — 93000 ELECTROCARDIOGRAM COMPLETE: CPT | Performed by: FAMILY MEDICINE

## 2023-03-24 PROCEDURE — 99214 OFFICE O/P EST MOD 30 MIN: CPT | Performed by: FAMILY MEDICINE

## 2023-03-24 PROCEDURE — 36415 COLL VENOUS BLD VENIPUNCTURE: CPT

## 2023-03-24 PROCEDURE — 80053 COMPREHEN METABOLIC PANEL: CPT

## 2023-03-24 PROCEDURE — 85025 COMPLETE CBC W/AUTO DIFF WBC: CPT

## 2023-03-24 PROCEDURE — 83735 ASSAY OF MAGNESIUM: CPT

## 2023-03-24 PROCEDURE — 82607 VITAMIN B-12: CPT

## 2023-03-24 NOTE — PATIENT INSTRUCTIONS
Continue current medications. Schedule appointment with Nephrologist and Neurologist for further evaluation. Check labs and urine today. Recommend to return to clinic if any concern.

## 2023-03-28 ENCOUNTER — TELEPHONE (OUTPATIENT)
Dept: FAMILY MEDICINE CLINIC | Facility: CLINIC | Age: 73
End: 2023-03-28

## 2023-03-28 NOTE — TELEPHONE ENCOUNTER
Patient just left another Dr and she can not remember anything to help her understand why she only sees 4 scripts for meds when she usually takes 8, please call patient back to advise

## 2023-03-28 NOTE — TELEPHONE ENCOUNTER
Spoke to pt stated that she is getting a bunch of messages from [facility], pt stated she doesn't know what they are for. Pt stated that it is an email, informed pt that we do not send emails. Pt stated that she saw Dr. Yoselyn Espinal, pt stated that today is Friday, then stated that she saw Dr. Kvng Simons today. Informed pt of correct date and that she saw Dr. Kvng Simons last Friday. Pt stated her medication question is for who she saw today, advised pt to call back to Dr. Maxi Yu office, gave phone number.

## 2023-03-29 ENCOUNTER — TELEPHONE (OUTPATIENT)
Dept: FAMILY MEDICINE CLINIC | Facility: CLINIC | Age: 73
End: 2023-03-29

## 2023-03-29 ENCOUNTER — LABORATORY ENCOUNTER (OUTPATIENT)
Dept: LAB | Age: 73
End: 2023-03-29
Attending: FAMILY MEDICINE
Payer: MEDICARE

## 2023-03-29 DIAGNOSIS — N18.31 CHRONIC KIDNEY DISEASE (CKD) STAGE G3A/A1, MODERATELY DECREASED GLOMERULAR FILTRATION RATE (GFR) BETWEEN 45-59 ML/MIN/1.73 SQUARE METER AND ALBUMINURIA CREATININE RATIO LESS THAN 30 MG/G (HCC): ICD-10-CM

## 2023-03-29 DIAGNOSIS — E83.52 HYPERCALCEMIA: ICD-10-CM

## 2023-03-29 LAB
ANION GAP SERPL CALC-SCNC: 5 MMOL/L (ref 0–18)
BILIRUB UR QL STRIP.AUTO: NEGATIVE
BUN BLD-MCNC: 32 MG/DL (ref 7–18)
CALCIUM BLD-MCNC: 10.2 MG/DL (ref 8.5–10.1)
CHLORIDE SERPL-SCNC: 107 MMOL/L (ref 98–112)
CO2 SERPL-SCNC: 26 MMOL/L (ref 21–32)
COLOR UR AUTO: YELLOW
CREAT BLD-MCNC: 1.2 MG/DL
CREAT UR-SCNC: 207 MG/DL
FASTING STATUS PATIENT QL REPORTED: YES
GFR SERPLBLD BASED ON 1.73 SQ M-ARVRAT: 48 ML/MIN/1.73M2 (ref 60–?)
GLUCOSE BLD-MCNC: 129 MG/DL (ref 70–99)
GLUCOSE UR STRIP.AUTO-MCNC: NEGATIVE MG/DL
KETONES UR STRIP.AUTO-MCNC: NEGATIVE MG/DL
MICROALBUMIN UR-MCNC: 21.1 MG/DL
MICROALBUMIN/CREAT 24H UR-RTO: 101.9 UG/MG (ref ?–30)
NITRITE UR QL STRIP.AUTO: POSITIVE
OSMOLALITY SERPL CALC.SUM OF ELEC: 295 MOSM/KG (ref 275–295)
PH UR STRIP.AUTO: 5 [PH] (ref 5–8)
PHOSPHATE SERPL-MCNC: 3.1 MG/DL (ref 2.5–4.9)
POTASSIUM SERPL-SCNC: 3.8 MMOL/L (ref 3.5–5.1)
PROT UR STRIP.AUTO-MCNC: 100 MG/DL
PROT UR-MCNC: 47.5 MG/DL
PTH-INTACT SERPL-MCNC: 35.9 PG/ML (ref 18.5–88)
RBC #/AREA URNS AUTO: >10 /HPF
SODIUM SERPL-SCNC: 138 MMOL/L (ref 136–145)
SP GR UR STRIP.AUTO: 1.02 (ref 1–1.03)
UROBILINOGEN UR STRIP.AUTO-MCNC: <2 MG/DL
VIT D+METAB SERPL-MCNC: 24.6 NG/ML (ref 30–100)
WBC #/AREA URNS AUTO: >50 /HPF
WBC CLUMPS UR QL AUTO: PRESENT /HPF

## 2023-03-29 PROCEDURE — 84156 ASSAY OF PROTEIN URINE: CPT

## 2023-03-29 PROCEDURE — 86334 IMMUNOFIX E-PHORESIS SERUM: CPT

## 2023-03-29 PROCEDURE — 82652 VIT D 1 25-DIHYDROXY: CPT

## 2023-03-29 PROCEDURE — 83970 ASSAY OF PARATHORMONE: CPT

## 2023-03-29 PROCEDURE — 81001 URINALYSIS AUTO W/SCOPE: CPT

## 2023-03-29 PROCEDURE — 82043 UR ALBUMIN QUANTITATIVE: CPT

## 2023-03-29 PROCEDURE — 84165 PROTEIN E-PHORESIS SERUM: CPT

## 2023-03-29 PROCEDURE — 82570 ASSAY OF URINE CREATININE: CPT

## 2023-03-29 PROCEDURE — 86335 IMMUNFIX E-PHORSIS/URINE/CSF: CPT

## 2023-03-29 PROCEDURE — 84166 PROTEIN E-PHORESIS/URINE/CSF: CPT

## 2023-03-29 PROCEDURE — 36415 COLL VENOUS BLD VENIPUNCTURE: CPT

## 2023-03-29 PROCEDURE — 82306 VITAMIN D 25 HYDROXY: CPT

## 2023-03-29 PROCEDURE — 83521 IG LIGHT CHAINS FREE EACH: CPT

## 2023-03-29 PROCEDURE — 80048 BASIC METABOLIC PNL TOTAL CA: CPT

## 2023-03-29 PROCEDURE — 84100 ASSAY OF PHOSPHORUS: CPT

## 2023-03-29 PROCEDURE — 82164 ANGIOTENSIN I ENZYME TEST: CPT

## 2023-03-30 LAB — ANGIOTENSIN CONVERTING ENZYME: <10 U/L

## 2023-03-31 ENCOUNTER — TELEPHONE (OUTPATIENT)
Dept: FAMILY MEDICINE CLINIC | Facility: CLINIC | Age: 73
End: 2023-03-31

## 2023-03-31 LAB
1,25-DIHYDROXYVITAMIN D: 64.6 PG/ML
ALBUMIN SERPL ELPH-MCNC: 4.18 G/DL (ref 3.75–5.21)
ALBUMIN/GLOB SERPL: 1.6 {RATIO} (ref 1–2)
ALPHA1 GLOB SERPL ELPH-MCNC: 0.31 G/DL (ref 0.19–0.46)
ALPHA2 GLOB SERPL ELPH-MCNC: 0.74 G/DL (ref 0.48–1.05)
B-GLOBULIN SERPL ELPH-MCNC: 0.79 G/DL (ref 0.68–1.23)
GAMMA GLOB SERPL ELPH-MCNC: 0.78 G/DL (ref 0.62–1.7)
KAPPA LC FREE SER-MCNC: 1.67 MG/DL (ref 0.33–1.94)
KAPPA LC FREE/LAMBDA FREE SER NEPH: 1.29 {RATIO} (ref 0.26–1.65)
LAMBDA LC FREE SERPL-MCNC: 1.29 MG/DL (ref 0.57–2.63)
PROT SERPL-MCNC: 6.8 G/DL (ref 6.4–8.2)

## 2023-03-31 NOTE — TELEPHONE ENCOUNTER
Wants to get a print out of her last visit. Does not use her mychart . Wants to know why her medications were changed also.

## 2023-03-31 NOTE — TELEPHONE ENCOUNTER
Spoke to pt wanted to get a copy of her last visit.  Nothing further needed per pt      AVS printed and mailed to pt

## 2023-03-31 NOTE — TELEPHONE ENCOUNTER
Spoke to pt wanting to know about lab results, lab results were sent via a [employer] message on 3/27, pt read message. Reviewed labs with pt, pt verbalized understanding of lab results and recommendations. Pt had no further questions.      appt made    Future Appointments   Date Time Provider [facility]   4/25/2023 10:00 AM Radhika Oquendo MD [facility] [place] [facility]   8/3/2023  8:00 AM Jeannette Frazier MD [facility] [place] [facility]

## 2023-04-21 ENCOUNTER — TELEPHONE (OUTPATIENT)
Dept: FAMILY MEDICINE CLINIC | Facility: CLINIC | Age: 73
End: 2023-04-21

## 2023-04-21 DIAGNOSIS — D75.839 THROMBOCYTHEMIA: ICD-10-CM

## 2023-04-21 DIAGNOSIS — N18.31 STAGE 3A CHRONIC KIDNEY DISEASE (HCC): Primary | ICD-10-CM

## 2023-04-21 NOTE — TELEPHONE ENCOUNTER
Spoke to pt,informed that last appt on 3/24/23 she was advised to follow up in 1 month and repeat the labs. Pt verbalized understanding, wants to know if she can get her labs done before the appt to discuss at the time of appt.      Please advise

## 2023-04-21 NOTE — TELEPHONE ENCOUNTER
Spoke to pt appt made for lab work, pt still questing appt, stated \"no-one told me that I needed to come in and why do I need to have labs done\" reviewed last lab results and note regarding need to re-do labs and see Dr. Mary Marie in 2 month. Pt repeated story from childhood about the polio vaccine and her distrust of doctors. \"I think this is just a way to charge medicare\" informed pt that we are not doing that, there were abnormalities in last labs that need to be checked again. Reviewed those with pt, pt verbalized understanding and had no further questions.     Future Appointments   Date Time Provider [facility]   4/24/2023 10:45 AM [facility]   4/25/2023 10:00 AM Gabriela Frazier MD [facility] [place] [place]   8/3/2023  8:00 AM Gabriela Frazier MD [facility] [place] [place]

## 2023-04-21 NOTE — TELEPHONE ENCOUNTER
She would like to know why she needs to come in? Please give her a call.   Future Appointments   Date Time Provider [facility]   4/25/2023 10:00 AM Raad Salgado MD [facility] [place] [facility]   8/3/2023  8:00 AM Claire Frazier MD [facility] [place] [facility]

## 2023-04-24 ENCOUNTER — LABORATORY ENCOUNTER (OUTPATIENT)
Dept: LAB | Age: 73
End: 2023-04-24
Attending: FAMILY MEDICINE
Payer: MEDICARE

## 2023-04-24 DIAGNOSIS — D75.839 THROMBOCYTHEMIA: ICD-10-CM

## 2023-04-24 DIAGNOSIS — N18.31 CHRONIC KIDNEY DISEASE (CKD) STAGE G3A/A1, MODERATELY DECREASED GLOMERULAR FILTRATION RATE (GFR) BETWEEN 45-59 ML/MIN/1.73 SQUARE METER AND ALBUMINURIA CREATININE RATIO LESS THAN 30 MG/G (HCC): ICD-10-CM

## 2023-04-24 DIAGNOSIS — R41.89 COGNITIVE DECLINE: ICD-10-CM

## 2023-04-24 DIAGNOSIS — E83.52 HYPERCALCEMIA: ICD-10-CM

## 2023-04-24 DIAGNOSIS — N18.31 STAGE 3A CHRONIC KIDNEY DISEASE (HCC): ICD-10-CM

## 2023-04-24 LAB
ALBUMIN SERPL-MCNC: 3.9 G/DL (ref 3.4–5)
ALBUMIN/GLOB SERPL: 1.2 {RATIO} (ref 1–2)
ALP LIVER SERPL-CCNC: 71 U/L
ALT SERPL-CCNC: 30 U/L
ANION GAP SERPL CALC-SCNC: 6 MMOL/L (ref 0–18)
AST SERPL-CCNC: 18 U/L (ref 15–37)
BASOPHILS # BLD AUTO: 0.05 X10(3) UL (ref 0–0.2)
BASOPHILS NFR BLD AUTO: 0.5 %
BILIRUB SERPL-MCNC: 0.5 MG/DL (ref 0.1–2)
BILIRUB UR QL STRIP.AUTO: NEGATIVE
BUN BLD-MCNC: 22 MG/DL (ref 7–18)
CALCIUM BLD-MCNC: 9.8 MG/DL (ref 8.5–10.1)
CHLORIDE SERPL-SCNC: 108 MMOL/L (ref 98–112)
CO2 SERPL-SCNC: 25 MMOL/L (ref 21–32)
COLOR UR AUTO: YELLOW
CREAT BLD-MCNC: 1.06 MG/DL
EOSINOPHIL # BLD AUTO: 0.21 X10(3) UL (ref 0–0.7)
EOSINOPHIL NFR BLD AUTO: 1.9 %
ERYTHROCYTE [DISTWIDTH] IN BLOOD BY AUTOMATED COUNT: 14.4 %
FASTING STATUS PATIENT QL REPORTED: YES
GFR SERPLBLD BASED ON 1.73 SQ M-ARVRAT: 55 ML/MIN/1.73M2 (ref 60–?)
GLOBULIN PLAS-MCNC: 3.3 G/DL (ref 2.8–4.4)
GLUCOSE BLD-MCNC: 113 MG/DL (ref 70–99)
GLUCOSE UR STRIP.AUTO-MCNC: NEGATIVE MG/DL
HCT VFR BLD AUTO: 48.2 %
HGB BLD-MCNC: 15.5 G/DL
IMM GRANULOCYTES # BLD AUTO: 0.04 X10(3) UL (ref 0–1)
IMM GRANULOCYTES NFR BLD: 0.4 %
KETONES UR STRIP.AUTO-MCNC: NEGATIVE MG/DL
LYMPHOCYTES # BLD AUTO: 2.81 X10(3) UL (ref 1–4)
LYMPHOCYTES NFR BLD AUTO: 26 %
MCH RBC QN AUTO: 27.3 PG (ref 26–34)
MCHC RBC AUTO-ENTMCNC: 32.2 G/DL (ref 31–37)
MCV RBC AUTO: 85 FL
MONOCYTES # BLD AUTO: 0.75 X10(3) UL (ref 0.1–1)
MONOCYTES NFR BLD AUTO: 6.9 %
NEUTROPHILS # BLD AUTO: 6.95 X10 (3) UL (ref 1.5–7.7)
NEUTROPHILS # BLD AUTO: 6.95 X10(3) UL (ref 1.5–7.7)
NEUTROPHILS NFR BLD AUTO: 64.3 %
NITRITE UR QL STRIP.AUTO: POSITIVE
OSMOLALITY SERPL CALC.SUM OF ELEC: 292 MOSM/KG (ref 275–295)
PH UR STRIP.AUTO: 7 [PH] (ref 5–8)
PLATELET # BLD AUTO: 506 10(3)UL (ref 150–450)
POTASSIUM SERPL-SCNC: 4.1 MMOL/L (ref 3.5–5.1)
PROT SERPL-MCNC: 7.2 G/DL (ref 6.4–8.2)
PROT UR STRIP.AUTO-MCNC: NEGATIVE MG/DL
RBC # BLD AUTO: 5.67 X10(6)UL
RBC UR QL AUTO: NEGATIVE
SODIUM SERPL-SCNC: 139 MMOL/L (ref 136–145)
SP GR UR STRIP.AUTO: 1.01 (ref 1–1.03)
UROBILINOGEN UR STRIP.AUTO-MCNC: <2 MG/DL
WBC # BLD AUTO: 10.8 X10(3) UL (ref 4–11)
WBC CLUMPS UR QL AUTO: PRESENT /HPF

## 2023-04-24 PROCEDURE — 87086 URINE CULTURE/COLONY COUNT: CPT

## 2023-04-24 PROCEDURE — 36415 COLL VENOUS BLD VENIPUNCTURE: CPT

## 2023-04-24 PROCEDURE — 87077 CULTURE AEROBIC IDENTIFY: CPT

## 2023-04-24 PROCEDURE — 80053 COMPREHEN METABOLIC PANEL: CPT

## 2023-04-24 PROCEDURE — 85025 COMPLETE CBC W/AUTO DIFF WBC: CPT

## 2023-04-24 PROCEDURE — 87186 SC STD MICRODIL/AGAR DIL: CPT

## 2023-04-24 PROCEDURE — 81001 URINALYSIS AUTO W/SCOPE: CPT

## 2023-04-25 ENCOUNTER — OFFICE VISIT (OUTPATIENT)
Dept: FAMILY MEDICINE CLINIC | Facility: CLINIC | Age: 73
End: 2023-04-25
Payer: MEDICARE

## 2023-04-25 ENCOUNTER — TELEPHONE (OUTPATIENT)
Dept: FAMILY MEDICINE CLINIC | Facility: CLINIC | Age: 73
End: 2023-04-25

## 2023-04-25 VITALS
DIASTOLIC BLOOD PRESSURE: 76 MMHG | TEMPERATURE: 98 F | SYSTOLIC BLOOD PRESSURE: 132 MMHG | RESPIRATION RATE: 16 BRPM | WEIGHT: 130 LBS | BODY MASS INDEX: 20.89 KG/M2 | HEIGHT: 66 IN | HEART RATE: 63 BPM | OXYGEN SATURATION: 98 %

## 2023-04-25 DIAGNOSIS — N30.00 ACUTE CYSTITIS WITHOUT HEMATURIA: Primary | ICD-10-CM

## 2023-04-25 DIAGNOSIS — I10 BENIGN ESSENTIAL HYPERTENSION: ICD-10-CM

## 2023-04-25 DIAGNOSIS — N18.31 STAGE 3A CHRONIC KIDNEY DISEASE (HCC): ICD-10-CM

## 2023-04-25 DIAGNOSIS — R41.89 COGNITIVE DECLINE: ICD-10-CM

## 2023-04-25 PROCEDURE — 99214 OFFICE O/P EST MOD 30 MIN: CPT | Performed by: FAMILY MEDICINE

## 2023-04-25 RX ORDER — SULFAMETHOXAZOLE AND TRIMETHOPRIM 800; 160 MG/1; MG/1
1 TABLET ORAL 2 TIMES DAILY
Qty: 20 TABLET | Refills: 0 | Status: SHIPPED | OUTPATIENT
Start: 2023-04-25

## 2023-04-25 RX ORDER — SULFAMETHOXAZOLE AND TRIMETHOPRIM 800; 160 MG/1; MG/1
1 TABLET ORAL 2 TIMES DAILY
Qty: 20 TABLET | Refills: 0 | Status: CANCELLED | OUTPATIENT
Start: 2023-04-25

## 2023-04-25 NOTE — TELEPHONE ENCOUNTER
Please inform patient that her urine analysis is suggestive of urinary tract infection. Recommend to start Bactrim 1 tablet twice a day for 10 days. Also recommend plenty of fluid. Her kidney functions are improving, still remains slightly declined. Platelet count remains elevated at 506,000. Rest of labs are okay. Recommend to return to clinic if any urinary symptoms or if any concerns.

## 2023-04-25 NOTE — PATIENT INSTRUCTIONS
Urine analysis is suggestive of UTI. Recommend to start Bactrim with food and daily probiotics. Drink plenty of fluid. Kidney functions are improving, still remains declined. Continue to follow-up with nephrology. Recommend to schedule appointment with neurology for evaluation of declining cognition. Return to clinic if any concern. Recheck urine in 2 weeks.

## 2023-04-27 ENCOUNTER — TELEPHONE (OUTPATIENT)
Dept: FAMILY MEDICINE CLINIC | Facility: CLINIC | Age: 73
End: 2023-04-27

## 2023-04-27 NOTE — TELEPHONE ENCOUNTER
Routing refill request to provider for review/approval because:  Labs not current:  potassium         Spoke to pt calling in to get her lab report from when she was in the office. Informed pt that we discussed her labs yesterday and that she wrote them down. Pt stated she found her note and is very thankful of the reminder. Pt will go  her medication. Pt stated that she is planning to move in August.     No further questions.

## 2023-06-28 DIAGNOSIS — I10 BENIGN ESSENTIAL HYPERTENSION: ICD-10-CM

## 2023-06-28 DIAGNOSIS — E78.49 OTHER HYPERLIPIDEMIA: ICD-10-CM

## 2023-06-28 RX ORDER — METOPROLOL SUCCINATE 100 MG/1
100 TABLET, EXTENDED RELEASE ORAL DAILY
Qty: 90 TABLET | Refills: 1 | Status: SHIPPED | OUTPATIENT
Start: 2023-06-28

## 2023-06-28 RX ORDER — FENOFIBRATE 145 MG/1
145 TABLET, COATED ORAL DAILY
Qty: 90 TABLET | Refills: 1 | Status: SHIPPED | OUTPATIENT
Start: 2023-06-28

## 2023-07-10 ENCOUNTER — TELEPHONE (OUTPATIENT)
Dept: FAMILY MEDICINE CLINIC | Facility: CLINIC | Age: 73
End: 2023-07-10

## 2023-07-10 NOTE — TELEPHONE ENCOUNTER
[place]. Needs 30 days of pravastatin and amlodipine called to [facility]. Thinks they are the only 2 she needs.  Wants to speak to you before you refill them

## 2023-07-10 NOTE — TELEPHONE ENCOUNTER
Spoke to pt stated that she is moving and will find new physician when gets there. Pt stated she should be ok on her medications, will have her friend look through them with her. Pt should have enough to get through with refills till September. Pt may call back with questions on her medication scripts. No further questions    Ok to cancel appt. For August 3rd.- done.

## 2023-07-10 NOTE — TELEPHONE ENCOUNTER
Patient is moving out of [facility], [place]. , would like to know if she can a refill on all her medications until she finds another doctor? Would also like to go over her medications.   Future Appointments   Date Time Provider Dulce Barker   8/3/2023  8:00 AM Marquise Green MD [facility]

## 2023-07-12 RX ORDER — GLIPIZIDE 2.5 MG/1
TABLET, EXTENDED RELEASE ORAL
Qty: 90 TABLET | Refills: 0 | OUTPATIENT
Start: 2023-07-12

## 2023-07-19 ENCOUNTER — PATIENT OUTREACH (OUTPATIENT)
Dept: CASE MANAGEMENT | Age: 73
End: 2023-07-19

## 2023-07-19 NOTE — PROCEDURES
The office order for PCP removal request is Approved and finalized on July 19, 2023.     Thanks,  [facility] [employer]

## 2023-08-21 ENCOUNTER — TELEPHONE (OUTPATIENT)
Dept: FAMILY MEDICINE CLINIC | Facility: CLINIC | Age: 73
End: 2023-08-21

## 2023-08-21 NOTE — TELEPHONE ENCOUNTER
Received a medical records request from [facility] asking for all records. Request sent to [facility].

## 2023-08-22 DIAGNOSIS — I10 BENIGN ESSENTIAL HYPERTENSION: Primary | ICD-10-CM

## 2023-08-22 RX ORDER — ENALAPRIL MALEATE 20 MG/1
20 TABLET ORAL 2 TIMES DAILY
Qty: 90 TABLET | Refills: 0 | Status: SHIPPED | OUTPATIENT
Start: 2023-08-22

## 2023-08-22 NOTE — TELEPHONE ENCOUNTER
Last Visit: 4/25/2023  Return in about 6 months (around 10/25/2023) for follow up     No future appointments.

## 2023-08-24 DIAGNOSIS — I10 BENIGN ESSENTIAL HYPERTENSION: ICD-10-CM

## 2023-08-24 RX ORDER — ENALAPRIL MALEATE 20 MG/1
20 TABLET ORAL 2 TIMES DAILY
Qty: 90 TABLET | Refills: 1 | OUTPATIENT
Start: 2023-08-24

## 2025-04-09 NOTE — TELEPHONE ENCOUNTER
Spoke to pt verbalized understanding of lab results and recommendations. No further questions. Subjective   Maricruz is a 6 year old female who is accompanied by:mother     Well Child Assessment:  History was provided by the mother. Maricruz lives with her mother and grandmother.   Nutrition  Types of intake include cereals, juices, meats and fish (only eats apples and pineapples for fruits, only broccoli for veggies). Junk food includes candy, chips and desserts (3  times a week).   Dental  The patient has a dental home. The patient brushes teeth regularly. The patient flosses regularly. Last dental exam was 6-12 months ago.   Elimination  Elimination problems do not include constipation, diarrhea or urinary symptoms. Toilet training is complete. There is no bed wetting.   Behavioral  Behavioral issues do not include biting, hitting or misbehaving with peers.   Sleep  Average sleep duration is 8 hours. The patient does not snore. There are no sleep problems.   Safety  There is no smoking in the home. Home has working smoke alarms? yes. Home has working carbon monoxide alarms? yes. There is no gun in home.   School  Current grade level is 1st. There are no signs of learning disabilities. Child is doing well in school.   Screening  Immunizations are up-to-date.     - Has astigmatism and follows with optometry. Wears glasses daily   - Saw podiatry for chronic foot pain, told it was flat foot. Wears insoles      Review of Systems   Constitutional:  Negative for appetite change and fever.   HENT:  Negative for congestion and rhinorrhea.    Respiratory:  Negative for snoring and cough.    Gastrointestinal:  Negative for constipation and diarrhea.   Genitourinary:  Negative for decreased urine volume.   Musculoskeletal:  Negative for gait problem.   Skin:  Negative for rash.   Neurological:  Negative for headaches.   Psychiatric/Behavioral:  Negative for sleep disturbance.        Patient's medications, allergies, past medical, surgical, social and family histories were reviewed and updated as  appropriate.    Objective   BP 95/65   Pulse (!) 62   Temp 97 °F (36.1 °C) (Temporal)   Ht 4' 4.46\" (1.332 m)   Wt 43.3 kg (95 lb 7.4 oz)   BMI 24.39 kg/m²   BSA 1.24 m²   BMI Percentile: 98.9 %ile (Z= 2.30, 122% of 95%ile) based on CDC (Girls, 2-20 Years) BMI-for-age based on BMI available on 4/11/2025.  Growth parameters appropriate for age? No - Obese    Physical Exam  Constitutional:       Appearance: She is obese.   HENT:      Head: Normocephalic and atraumatic.      Right Ear: Tympanic membrane normal.      Left Ear: Tympanic membrane normal.      Nose: Nose normal.      Mouth/Throat:      Mouth: Mucous membranes are moist.      Neck: Neck supple.   Eyes:      Extraocular Movements: Extraocular movements intact.   Cardiovascular:      Rate and Rhythm: Normal rate and regular rhythm.      Pulses: Normal pulses.      Heart sounds: Normal heart sounds.   Pulmonary:      Effort: Pulmonary effort is normal.      Breath sounds: Normal breath sounds.   Abdominal:      General: Bowel sounds are normal.      Palpations: Abdomen is soft.   Musculoskeletal:         General: Normal range of motion.   Skin:     General: Skin is warm.      Capillary Refill: Capillary refill takes less than 2 seconds.      Comments: Acanthosis nigricans to neck and armpits    Neurological:      General: No focal deficit present.      Mental Status: She is alert.   Psychiatric:         Mood and Affect: Mood normal.       Landen stage 1     Assessment   Problem List Items Addressed This Visit          Musculoskeletal and Injuries    Pes planus of both feet       Skin    Acanthosis nigricans     Other Visit Diagnoses         Encounter for routine child health examination with abnormal findings    -  Primary      Obesity peds (BMI >=95 percentile)        Relevant Orders    Comprehensive Metabolic Panel    Glycohemoglobin    Lipid Panel Without Reflex      Flexural eczema                Maricruz Hamilton is a 6 year old female with a history  of chronic foot pain presenting for the 6 year health maintenance visit. Wearing insoles with some improvement for flat foot. Pain likely also from body habitus. Discussed healthy eating options and exercise given continued weight gain. Labs ordered and will return in 3 mo for weight check. Otherwise noted to have eczema on flexor surfaces, instructed to use hydrocortisone. Will follow up during next visit. Has not had flare up since toddler years.     HMV  - Age appropriate anticipatory guidance discussed  - Bright Futures Handout provided  - Vaccines due at today's visit: None. Up to date.   - Provided school form (See answers to TB questionnaire in Screenings Tab)  - Pediatric Symptom Checklist performed. See results below.    Pediatric Symptom Check List 17 (PSC 17)  Attention: 0  Externalizin   Internalizin  Interpretation: NEG screen       Obesity   - F/u CMP, HgbA1C, lipid panel   - Goal for next visit; exercise daily for at least 10 mins and reduce junk food to once a week   - Return in 3 mo for weight check     Counseling  Nutrition/Weight Management:  Assessment and discussion of current Nutrition behaviors performed:  Yes  Assessment and discussion of current Physical Activity behaviors performed: Yes    Immunizations: per orders.  History of previous adverse reactions to immunizations? no  Immunizations given today: No    Follow-up yearly for a well visit, or sooner as needed.